# Patient Record
Sex: FEMALE | Race: WHITE | HISPANIC OR LATINO | Employment: UNEMPLOYED
[De-identification: names, ages, dates, MRNs, and addresses within clinical notes are randomized per-mention and may not be internally consistent; named-entity substitution may affect disease eponyms.]

---

## 2023-05-01 ENCOUNTER — TRANSCRIBE ORDERS (OUTPATIENT)
Dept: OTHER | Age: 43
End: 2023-05-01

## 2023-05-01 DIAGNOSIS — G50.0 TRIGEMINAL NEURALGIA: Primary | ICD-10-CM

## 2023-05-01 DIAGNOSIS — G51.8 PAIN DUE TO NEUROPATHY OF FACIAL NERVE: ICD-10-CM

## 2023-05-02 ENCOUNTER — TELEPHONE (OUTPATIENT)
Dept: NEUROLOGY | Facility: CLINIC | Age: 43
End: 2023-05-02
Payer: COMMERCIAL

## 2023-05-02 NOTE — TELEPHONE ENCOUNTER
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 G/l states pt needs to be seen by neurosurgery. Please review, advise and schedule pt. Thanks    Action Taken: Message routed to:  [facility] ([facility]): Neuro    Travel Screening: Not Applicable

## 2023-05-02 NOTE — TELEPHONE ENCOUNTER
Spoke to Miriam in neurosurgery, she stated that patient should be scheduled to see Leisa Rust in-person in Neurosurgery.    Patient Contacted to schedule the following:    Appointment type: New  Provider: TOBIAS Torres CNP  Return date: First available  Specialty phone number: [phone]  Additional appointment(s) needed: N/A  Additonal Notes: N/A    Spoke with patient, scheduled on 5/10 at 9am at the [facility] [place].     Fab Andrade on 5/2/2023 at 5:04 PM

## 2023-05-03 NOTE — TELEPHONE ENCOUNTER
Records Requested     May 3, 2023 11:15 AM   [id number]   Facility  [facility]   Outcome 11:18am Sent request for imaging to be pushed to PACS. -JA     Records Requested     May 3, 2023 11:19 AM   [id number]   Facility  [facility]   Outcome 11:21am Sent request for imaging to be pushed to PACS. 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

## 2023-05-10 ENCOUNTER — PRE VISIT (OUTPATIENT)
Dept: NEUROSURGERY | Facility: CLINIC | Age: 43
End: 2023-05-10